# Patient Record
(demographics unavailable — no encounter records)

---

## 2025-01-27 NOTE — HISTORY OF PRESENT ILLNESS
[de-identified] : HPI: Patient is a 79-year-old female who presents the office today for evaluation of right knee pain which has been present for the past 3-4 months without any known fall, injury, or trauma. She localizes the pain primarily to the medial aspect of the knee. States that the pain is constant but worse with weightbearing. Has been taking Aleve with some relief of her discomfort. Denies any previous injury or surgery to the right knee. No reported buckling or giving way of the right knee. No subjective locking of the right knee reported.  ROS: Positive for right knee pain

## 2025-01-27 NOTE — REASON FOR VISIT
[FreeTextEntry2] : Patient is here today for left hip, right knee and lower back.Right knee is feeling better does have a wrap did have a meniscus tear noted 10/11/2024 using CBD feels about 40% better lately pain in the left knee no trauma Left knee pain is worse when she would bend or squat

## 2025-01-27 NOTE — IMAGING
[de-identified] : Right knee No erythema No ecchymosis Tenderness to palpation over medial joint line Minimal effusion Able to perform active straight leg raise Knee flexion from 0-120 degrees Light touch is intact throughout Positive Landon's medially Negative anterior Drawer No appreciable instability with varus / valgus stress testing Calves are soft and nontender  Patient had three-view x-rays of the right knee performed at [facility] on 6/10/2024 with impression as per the radiologist: No evidence of acute fracture or dislocation. Small suprapatellar joint effusion. Mild to moderate tricompartmental degenerative changes. Vascular calcification. Left knee tenderness on the joint some crepitus  X-ray left knee minimal change

## 2025-01-27 NOTE — HISTORY OF PRESENT ILLNESS
[de-identified] : HPI: Patient is a 79-year-old female who presents the office today for evaluation of right knee pain which has been present for the past 3-4 months without any known fall, injury, or trauma. She localizes the pain primarily to the medial aspect of the knee. States that the pain is constant but worse with weightbearing. Has been taking Aleve with some relief of her discomfort. Denies any previous injury or surgery to the right knee. No reported buckling or giving way of the right knee. No subjective locking of the right knee reported.  ROS: Positive for right knee pain

## 2025-01-27 NOTE — IMAGING
[de-identified] : Right knee No erythema No ecchymosis Tenderness to palpation over medial joint line Minimal effusion Able to perform active straight leg raise Knee flexion from 0-120 degrees Light touch is intact throughout Positive Landon's medially Negative anterior Drawer No appreciable instability with varus / valgus stress testing Calves are soft and nontender  Patient had three-view x-rays of the right knee performed at [facility] on 6/10/2024 with impression as per the radiologist: No evidence of acute fracture or dislocation. Small suprapatellar joint effusion. Mild to moderate tricompartmental degenerative changes. Vascular calcification. Left knee tenderness on the joint some crepitus  X-ray left knee minimal change

## 2025-01-27 NOTE — ASSESSMENT
[FreeTextEntry1] : No intervention on the right knee suggested a left knee MRI note deferred on any injections to either knee I will see her in a few months On Plavix deferred on any NSAIDs